# Patient Record
Sex: MALE | URBAN - METROPOLITAN AREA
[De-identification: names, ages, dates, MRNs, and addresses within clinical notes are randomized per-mention and may not be internally consistent; named-entity substitution may affect disease eponyms.]

---

## 2022-12-16 ENCOUNTER — HOSPITAL ENCOUNTER (OUTPATIENT)
Dept: TELEMEDICINE | Facility: HOSPITAL | Age: 72
Discharge: HOME | End: 2022-12-16

## 2022-12-16 NOTE — CARE UPDATE
Tele stroke attending on call:    Called regarding this pt without known medical history who developed L sided weakness and slurred speech last night at 8 pm and woke up with worsening of the above stated stated symptoms.  On neuro exam he has mild L hemiparesis and dysarthria.  Initial head CT read as showing age indeterminate anette infarct but subsequent MRI brain reported as showing no acute intracranial abnormality.  Overall presentation highly suggestive of an acute subcortical lacunar syndrome that perhaps was undetected by MRI. Thus, recommended competing stroke work up with CTA brain and neck, TTE, and treating him with asa+plavix, atorvastatin, and repeating MRI brain with coronal DWI tomorrow.  Neurology, PT/OT and speech therapy consults.      .Surya Mendosa MD   Vascular Neurology  [facility]  [facility]